# Patient Record
Sex: FEMALE | Race: WHITE | Employment: FULL TIME | URBAN - METROPOLITAN AREA
[De-identification: names, ages, dates, MRNs, and addresses within clinical notes are randomized per-mention and may not be internally consistent; named-entity substitution may affect disease eponyms.]

---

## 2021-02-17 ENCOUNTER — HOSPITAL ENCOUNTER (EMERGENCY)
Age: 56
Discharge: HOME | End: 2021-02-17
Attending: EMERGENCY MEDICINE

## 2021-02-17 VITALS
HEART RATE: 70 BPM | BODY MASS INDEX: 32.66 KG/M2 | SYSTOLIC BLOOD PRESSURE: 145 MMHG | DIASTOLIC BLOOD PRESSURE: 76 MMHG | WEIGHT: 173 LBS | TEMPERATURE: 96.8 F | RESPIRATION RATE: 14 BRPM | OXYGEN SATURATION: 97 % | HEIGHT: 61 IN

## 2021-02-17 DIAGNOSIS — R21 RASH AND OTHER NONSPECIFIC SKIN ERUPTION: ICD-10-CM

## 2021-02-17 DIAGNOSIS — R22.0 LIP SWELLING: Primary | ICD-10-CM

## 2021-02-17 LAB
ANION GAP SERPL CALCULATED.3IONS-SCNC: 12 MMOL/L (ref 4–16)
BASOPHILS ABSOLUTE: 0 K/CU MM
BASOPHILS RELATIVE PERCENT: 0.2 % (ref 0–1)
BUN BLDV-MCNC: 14 MG/DL (ref 6–23)
CALCIUM SERPL-MCNC: 9.4 MG/DL (ref 8.3–10.6)
CHLORIDE BLD-SCNC: 103 MMOL/L (ref 99–110)
CO2: 25 MMOL/L (ref 21–32)
CREAT SERPL-MCNC: 0.6 MG/DL (ref 0.6–1.1)
DIFFERENTIAL TYPE: ABNORMAL
EOSINOPHILS ABSOLUTE: 0.1 K/CU MM
EOSINOPHILS RELATIVE PERCENT: 1.6 % (ref 0–3)
GFR AFRICAN AMERICAN: >60 ML/MIN/1.73M2
GFR NON-AFRICAN AMERICAN: >60 ML/MIN/1.73M2
GLUCOSE BLD-MCNC: 100 MG/DL (ref 70–99)
HCT VFR BLD CALC: 45.4 % (ref 37–47)
HEMOGLOBIN: 14.6 GM/DL (ref 12.5–16)
IMMATURE NEUTROPHIL %: 0.4 % (ref 0–0.43)
LYMPHOCYTES ABSOLUTE: 2.5 K/CU MM
LYMPHOCYTES RELATIVE PERCENT: 30.5 % (ref 24–44)
MCH RBC QN AUTO: 27.7 PG (ref 27–31)
MCHC RBC AUTO-ENTMCNC: 32.2 % (ref 32–36)
MCV RBC AUTO: 86.1 FL (ref 78–100)
MONOCYTES ABSOLUTE: 0.4 K/CU MM
MONOCYTES RELATIVE PERCENT: 5.4 % (ref 0–4)
PDW BLD-RTO: 14.8 % (ref 11.7–14.9)
PLATELET # BLD: 427 K/CU MM (ref 140–440)
PMV BLD AUTO: 10.4 FL (ref 7.5–11.1)
POTASSIUM SERPL-SCNC: 4.3 MMOL/L (ref 3.5–5.1)
RBC # BLD: 5.27 M/CU MM (ref 4.2–5.4)
SEGMENTED NEUTROPHILS ABSOLUTE COUNT: 5 K/CU MM
SEGMENTED NEUTROPHILS RELATIVE PERCENT: 61.9 % (ref 36–66)
SODIUM BLD-SCNC: 140 MMOL/L (ref 135–145)
TOTAL IMMATURE NEUTOROPHIL: 0.03 K/CU MM
WBC # BLD: 8.1 K/CU MM (ref 4–10.5)

## 2021-02-17 PROCEDURE — 2500000003 HC RX 250 WO HCPCS: Performed by: EMERGENCY MEDICINE

## 2021-02-17 PROCEDURE — 80048 BASIC METABOLIC PNL TOTAL CA: CPT

## 2021-02-17 PROCEDURE — 96374 THER/PROPH/DIAG INJ IV PUSH: CPT

## 2021-02-17 PROCEDURE — 96375 TX/PRO/DX INJ NEW DRUG ADDON: CPT

## 2021-02-17 PROCEDURE — 2580000003 HC RX 258: Performed by: EMERGENCY MEDICINE

## 2021-02-17 PROCEDURE — 99283 EMERGENCY DEPT VISIT LOW MDM: CPT

## 2021-02-17 PROCEDURE — 85025 COMPLETE CBC W/AUTO DIFF WBC: CPT

## 2021-02-17 PROCEDURE — 6360000002 HC RX W HCPCS: Performed by: EMERGENCY MEDICINE

## 2021-02-17 RX ORDER — METHYLPREDNISOLONE SODIUM SUCCINATE 125 MG/2ML
125 INJECTION, POWDER, LYOPHILIZED, FOR SOLUTION INTRAMUSCULAR; INTRAVENOUS ONCE
Status: COMPLETED | OUTPATIENT
Start: 2021-02-17 | End: 2021-02-17

## 2021-02-17 RX ORDER — PREDNISONE 10 MG/1
TABLET ORAL
Qty: 30 TABLET | Refills: 0 | Status: SHIPPED | OUTPATIENT
Start: 2021-02-17

## 2021-02-17 RX ORDER — 0.9 % SODIUM CHLORIDE 0.9 %
1000 INTRAVENOUS SOLUTION INTRAVENOUS ONCE
Status: COMPLETED | OUTPATIENT
Start: 2021-02-17 | End: 2021-02-17

## 2021-02-17 RX ADMIN — SODIUM CHLORIDE 1000 ML: 9 INJECTION, SOLUTION INTRAVENOUS at 14:23

## 2021-02-17 RX ADMIN — METHYLPREDNISOLONE SODIUM SUCCINATE 125 MG: 125 INJECTION, POWDER, FOR SOLUTION INTRAMUSCULAR; INTRAVENOUS at 14:24

## 2021-02-17 RX ADMIN — FAMOTIDINE 20 MG: 10 INJECTION INTRAVENOUS at 14:25

## 2021-02-17 NOTE — ED PROVIDER NOTES
[facility]i 58  Chief Complaint   Patient presents with    Facial Swelling     reports started this am (swollen lips.) reports nothing new. reports drank gaterade this am     HISTORY OF PRESENT ILLNESS  Mohamud Sim is a 48 y.o. female who presents to the ED complaining of lip swelling, upper and lower, without any pain or discomfort. She does report some tingling sensation to the lips. She denies any tongue, throat or other facial involvement. She denies any wheezing coughing shortness of breath or trouble with swallowing or breathing. No abd pain or n/v. She did drink Gatorade Zero prior to symptom onset but again has no symptoms in the throat. She does not believe she is had that particular flavor before but is not sure. She took 50 mg of diphenhydramine prehospital and already feels like her lips are getting better by the time she comes in. She has no history of angioedema. She is on pantoprazole but no other medications, specifically no ACE inhibitors. No other new medications, no other new allergens environments foods drinks or other exposures to explain a possible allergic reaction. She had Covid last month. She says she has recovered fully. Minor hives initially noted on the arms/back. Also was treated for scabies in the past month due to itching and diffuse rash though she denies any specific visualization of scabies and doesn't think that's what was causing her symptoms. No other complaints, modifying factors or associated symptoms. Nursing notes reviewed. No past medical history on file. No past surgical history on file. No family history on file.   Social History     Socioeconomic History    Marital status: Unknown     Spouse name: Not on file    Number of children: Not on file    Years of education: Not on file    Highest education level: Not on file   Occupational History    Not on file   Social Needs    Financial resource strain: Not on file    Food insecurity     Worry: Not on file     Inability: Not on file    Transportation needs     Medical: Not on file     Non-medical: Not on file   Tobacco Use    Smoking status: Never Smoker    Smokeless tobacco: Never Used   Substance and Sexual Activity    Alcohol use: Not Currently    Drug use: Not on file    Sexual activity: Not on file   Lifestyle    Physical activity     Days per week: Not on file     Minutes per session: Not on file    Stress: Not on file   Relationships    Social connections     Talks on phone: Not on file     Gets together: Not on file     Attends Yazidism service: Not on file     Active member of club or organization: Not on file     Attends meetings of clubs or organizations: Not on file     Relationship status: Not on file    Intimate partner violence     Fear of current or ex partner: Not on file     Emotionally abused: Not on file     Physically abused: Not on file     Forced sexual activity: Not on file   Other Topics Concern    Not on file   Social History Narrative    Not on file     No current facility-administered medications for this encounter. Current Outpatient Medications   Medication Sig Dispense Refill    predniSONE (DELTASONE) 10 MG tablet Take 4 tablets (40mg) by mouth daily x3 days. Then take 3 tablets (30mg) by mouth daily x3 days. Then take 2 tablets (20mg) by mouth daily x3 days. Then take 1 tablet (10mg) by mouth daily x3 days. Then discontinue this medication. Do not abruptly stop this medication. 30 tablet 0     No Known Allergies    REVIEW OF SYSTEMS  6 systems reviewed, pertinent positives per HPI otherwise noted to be negative    PHYSICAL EXAM   BP (!) 145/76   Pulse 70   Temp 96.8 °F (36 °C) (Infrared)   Resp 14   Ht 5' 1\" (1.549 m)   Wt 173 lb (78.5 kg)   SpO2 97%   BMI 32.69 kg/m²    GENERAL APPEARANCE: Awake and alert. Cooperative. No acute distress. HEAD: Normocephalic. Atraumatic. EYES: PERRL. EOM's grossly intact. ENT: Mucous membranes are moist.  Lips are edematous but not erythematous diffusely. Patient is able to phonate normally. Tongue is normal.  Oropharynx is clear, no edema or erythema present. NECK: Supple. Normal ROM. Trachea midline, no stridor, no lymphadenopathy. CHEST: Equal symmetric chest rise. Regular rate and rhythm, no murmur. LUNGS: Breathing is unlabored. Speaking comfortably in full sentences. CTAB without wheezing. ABDOMEN: Nondistended, nontender  EXTREMITIES: MAEE. No acute deformities. SKIN: Warm and dry. Old scabs noted that are not acutely infected, no redness or warmth or pustules, on the arms and back and abdomen. Initially has some minimal hives on the arms and back as well. NEUROLOGICAL: Alert and oriented. Strength is 5/5 in all extremities and sensation is intact. LABS  I have reviewed all labs for this visit.    Results for orders placed or performed during the hospital encounter of 02/17/21   CBC Auto Differential   Result Value Ref Range    WBC 8.1 4.0 - 10.5 K/CU MM    RBC 5.27 4.2 - 5.4 M/CU MM    Hemoglobin 14.6 12.5 - 16.0 GM/DL    Hematocrit 45.4 37 - 47 %    MCV 86.1 78 - 100 FL    MCH 27.7 27 - 31 PG    MCHC 32.2 32.0 - 36.0 %    RDW 14.8 11.7 - 14.9 %    Platelets 588 535 - 409 K/CU MM    MPV 10.4 7.5 - 11.1 FL    Differential Type AUTOMATED DIFFERENTIAL     Segs Relative 61.9 36 - 66 %    Lymphocytes % 30.5 24 - 44 %    Monocytes % 5.4 (H) 0 - 4 %    Eosinophils % 1.6 0 - 3 %    Basophils % 0.2 0 - 1 %    Segs Absolute 5.0 K/CU MM    Lymphocytes Absolute 2.5 K/CU MM    Monocytes Absolute 0.4 K/CU MM    Eosinophils Absolute 0.1 K/CU MM    Basophils Absolute 0.0 K/CU MM    Immature Neutrophil % 0.4 0 - 0.43 %    Total Immature Neutrophil 0.03 K/CU MM   Basic Metabolic Panel w/ Reflex to MG   Result Value Ref Range    Sodium 140 135 - 145 MMOL/L    Potassium 4.3 3.5 - 5.1 MMOL/L    Chloride 103 99 - 110 mMol/L    CO2 25 21 - 32 MMOL/L    Anion Gap 12 4 - 16    BUN 14 6 - 23 MG/DL    CREATININE 0.6 0.6 - 1.1 MG/DL    Glucose 100 (H) 70 - 99 MG/DL    Calcium 9.4 8.3 - 10.6 MG/DL    GFR Non-African American >60 >60 mL/min/1.73m2    GFR African American >60 >60 mL/min/1.73m2         ED COURSE/MDM  The patient's ED course was notable for lip swelling. She took 50 mg of diphenhydramine prehospital is already improving somewhat. Given the lip involvement though I did obtain some basic labs which was unremarkable and IV Solu-Medrol and IV Pepcid with IV fluids. On reassessment, she is feeling a lot better. Her lips are improved and minimally swollen. She remains with out any oropharyngeal swelling or tongue swelling or throat swelling or difficulty with swallowing or breathing. She initially had some hives but this improved as well. There is no evidence of tongue or intraoral involvement. She has no medications that are suspect for angioedema although hereditary angioedema is a consideration. Allergic reaction is also considered with her Gatorade usage and she was told to avoid this just in case in the future, however would be unusual to present with lip swelling without any tongue or intraoral findings. She is not hypotensive and has no other signs or symptoms to suggest anaphylaxis. I will send her home with a prednisone taper. She already has diphenhydramine which I advised her to take every 6 hours over the next 24 hours and as needed beyond that. She is not on any medications to suggest angioedema but I did advise PCP follow-up to consider autoimmune work-up or complement cascade testing as an outpatient. Strict return precautions and close monitoring of symptoms at home advised. Patient was given scripts for the following medications. I counseled patient how to take these medications. New Prescriptions    PREDNISONE (DELTASONE) 10 MG TABLET    Take 4 tablets (40mg) by mouth daily x3 days.   Then take 3 tablets (30mg) by mouth daily x3 days. Then take 2 tablets (20mg) by mouth daily x3 days. Then take 1 tablet (10mg) by mouth daily x3 days. Then discontinue this medication. Do not abruptly stop this medication. CLINICAL IMPRESSION  1. Lip swelling    2. Rash and other nonspecific skin eruption        Blood pressure (!) 145/76, pulse 70, temperature 96.8 °F (36 °C), temperature source Infrared, resp. rate 14, height 5' 1\" (1.549 m), weight 173 lb (78.5 kg), SpO2 97 %. DISPOSITION    I have discussed the findings of today's workup with the patient and addressed the patient's questions and concerns. Important warning signs as well as new or worsening symptoms which would necessitate immediate return to the ED were discussed. The plan is to discharge from the ED at this time, and the patient is in stable condition. The patient acknowledged understanding is agreeable with this plan. Follow-up with:  Jay Teague, [address] [facility]  [phone]    Schedule an appointment as soon as possible for a visit in 1 week  For symptom re-evaluation    [facility]  [address]  [phone]  Go to   If symptoms worsen      This chart was created using Dragon dictation software. Efforts were made by me to ensure accuracy, however some errors may be present due to limitations of this technology.         Sivan Cornejo MD  02/17/21 [id number]

## 2021-02-17 NOTE — ED NOTES
Pt lip swelling with improvement. Pt reports feeling better. D/C instructions explained,  Verbal understanding.       Trixie De Leon RN  02/17/21 3507